# Patient Record
Sex: FEMALE | Race: ASIAN | NOT HISPANIC OR LATINO
[De-identification: names, ages, dates, MRNs, and addresses within clinical notes are randomized per-mention and may not be internally consistent; named-entity substitution may affect disease eponyms.]

---

## 2018-07-05 ENCOUNTER — APPOINTMENT (OUTPATIENT)
Dept: OBGYN | Facility: CLINIC | Age: 53
End: 2018-07-05

## 2019-04-05 ENCOUNTER — APPOINTMENT (OUTPATIENT)
Dept: MAMMOGRAPHY | Facility: CLINIC | Age: 54
End: 2019-04-05
Payer: COMMERCIAL

## 2019-04-05 ENCOUNTER — APPOINTMENT (OUTPATIENT)
Dept: OBGYN | Facility: CLINIC | Age: 54
End: 2019-04-05
Payer: COMMERCIAL

## 2019-04-05 ENCOUNTER — OUTPATIENT (OUTPATIENT)
Dept: OUTPATIENT SERVICES | Facility: HOSPITAL | Age: 54
LOS: 1 days | End: 2019-04-05
Payer: COMMERCIAL

## 2019-04-05 VITALS
DIASTOLIC BLOOD PRESSURE: 91 MMHG | SYSTOLIC BLOOD PRESSURE: 181 MMHG | BODY MASS INDEX: 31.55 KG/M2 | HEART RATE: 91 BPM | HEIGHT: 63 IN | WEIGHT: 178.1 LBS

## 2019-04-05 DIAGNOSIS — J98.01 ACUTE BRONCHOSPASM: ICD-10-CM

## 2019-04-05 DIAGNOSIS — Z12.31 ENCOUNTER FOR SCREENING MAMMOGRAM FOR MALIGNANT NEOPLASM OF BREAST: ICD-10-CM

## 2019-04-05 DIAGNOSIS — N85.2 HYPERTROPHY OF UTERUS: ICD-10-CM

## 2019-04-05 DIAGNOSIS — Z00.8 ENCOUNTER FOR OTHER GENERAL EXAMINATION: ICD-10-CM

## 2019-04-05 DIAGNOSIS — R03.0 ELEVATED BLOOD-PRESSURE READING, W/OUT DIAGNOSIS OF HYPERTENSION: ICD-10-CM

## 2019-04-05 DIAGNOSIS — N39.3 STRESS INCONTINENCE (FEMALE) (MALE): ICD-10-CM

## 2019-04-05 PROCEDURE — 77067 SCR MAMMO BI INCL CAD: CPT

## 2019-04-05 PROCEDURE — 99386 PREV VISIT NEW AGE 40-64: CPT

## 2019-04-05 PROCEDURE — 77063 BREAST TOMOSYNTHESIS BI: CPT | Mod: 26

## 2019-04-05 PROCEDURE — 77067 SCR MAMMO BI INCL CAD: CPT | Mod: 26

## 2019-04-05 PROCEDURE — 77063 BREAST TOMOSYNTHESIS BI: CPT

## 2019-04-05 NOTE — HISTORY OF PRESENT ILLNESS
[___ Year(s) Ago] : [unfilled] year(s) ago [Good] : being in good health [Sexually Active] : is sexually active [Monogamous] : is monogamous [Male ___] : [unfilled] male [NA] : N/A

## 2019-04-05 NOTE — PHYSICAL EXAM
[Awake] : awake [Alert] : alert [Acute Distress] : no acute distress [LAD] : no lymphadenopathy [Thyroid Nodule] : no thyroid nodule [Goiter] : no goiter [Mass] : no breast mass [Nipple Discharge] : no nipple discharge [Axillary LAD] : no axillary lymphadenopathy [Soft] : soft [Tender] : non tender [Distended] : not distended [None] : no CVA tenderness [Oriented x3] : oriented to person, place, and time [Depressed Mood] : not depressed [Normal] : uterus [Atrophy] : atrophy [No Bleeding] : there was no active vaginal bleeding [Pap Obtained] : a Pap smear was performed [Anteversion] : anteverted [Tenderness] : nontender [Enlarged ___ wks] : enlarged [unfilled] ~Uweeks [Uterine Adnexae] : were not tender and not enlarged

## 2019-04-11 ENCOUNTER — APPOINTMENT (OUTPATIENT)
Dept: INTERNAL MEDICINE | Facility: CLINIC | Age: 54
End: 2019-04-11
Payer: COMMERCIAL

## 2019-04-11 VITALS
OXYGEN SATURATION: 98 % | HEIGHT: 63 IN | SYSTOLIC BLOOD PRESSURE: 150 MMHG | WEIGHT: 169 LBS | HEART RATE: 81 BPM | DIASTOLIC BLOOD PRESSURE: 85 MMHG | BODY MASS INDEX: 29.95 KG/M2

## 2019-04-11 PROCEDURE — 99215 OFFICE O/P EST HI 40 MIN: CPT

## 2019-04-11 NOTE — HISTORY OF PRESENT ILLNESS
[FreeTextEntry8] : Elevated blood pressure reading noted by her Gyn and advised to f/u with PCP.\par \par Lost to follow-up since 2016. 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

## 2019-04-11 NOTE — COUNSELING
[Weight management counseling provided] : Weight management [Healthy eating counseling provided] : healthy eating [Activity counseling provided] : activity [Low Salt Diet] : Low salt diet [Decrease Portions] : Decrease food portions [Walking] : Walking

## 2019-04-11 NOTE — PHYSICAL EXAM
[Normal Sclera/Conjunctiva] : normal sclera/conjunctiva [No JVD] : no jugular venous distention [No Respiratory Distress] : no respiratory distress  [Clear to Auscultation] : lungs were clear to auscultation bilaterally [Normal Rate] : normal rate  [Regular Rhythm] : with a regular rhythm [No Edema] : there was no peripheral edema

## 2019-04-18 LAB
CYTOLOGY CVX/VAG DOC THIN PREP: NORMAL
HPV HIGH+LOW RISK DNA PNL CVX: NOT DETECTED

## 2019-04-28 LAB
25(OH)D3 SERPL-MCNC: 12.5 NG/ML
ALBUMIN SERPL ELPH-MCNC: 4.8 G/DL
ALP BLD-CCNC: 117 U/L
ALT SERPL-CCNC: 35 U/L
ANION GAP SERPL CALC-SCNC: 13 MMOL/L
AST SERPL-CCNC: 24 U/L
BASOPHILS # BLD AUTO: 0.09 K/UL
BASOPHILS NFR BLD AUTO: 1.5 %
BILIRUB SERPL-MCNC: 0.4 MG/DL
BUN SERPL-MCNC: 11 MG/DL
CALCIUM SERPL-MCNC: 9.8 MG/DL
CHLORIDE SERPL-SCNC: 103 MMOL/L
CHOLEST SERPL-MCNC: 246 MG/DL
CHOLEST/HDLC SERPL: 4.2 RATIO
CO2 SERPL-SCNC: 26 MMOL/L
CREAT SERPL-MCNC: 0.65 MG/DL
EOSINOPHIL # BLD AUTO: 0.2 K/UL
EOSINOPHIL NFR BLD AUTO: 3.4 %
ESTIMATED AVERAGE GLUCOSE: 154 MG/DL
GLUCOSE SERPL-MCNC: 95 MG/DL
HBA1C MFR BLD HPLC: 7 %
HCT VFR BLD CALC: 48.7 %
HDLC SERPL-MCNC: 58 MG/DL
HGB BLD-MCNC: 14.8 G/DL
IMM GRANULOCYTES NFR BLD AUTO: 0.5 %
LDLC SERPL CALC-MCNC: 141 MG/DL
LYMPHOCYTES # BLD AUTO: 1.81 K/UL
LYMPHOCYTES NFR BLD AUTO: 30.4 %
MAN DIFF?: NORMAL
MCHC RBC-ENTMCNC: 27.7 PG
MCHC RBC-ENTMCNC: 30.4 GM/DL
MCV RBC AUTO: 91.2 FL
MONOCYTES # BLD AUTO: 0.47 K/UL
MONOCYTES NFR BLD AUTO: 7.9 %
NEUTROPHILS # BLD AUTO: 3.35 K/UL
NEUTROPHILS NFR BLD AUTO: 56.3 %
PLATELET # BLD AUTO: 330 K/UL
POTASSIUM SERPL-SCNC: 3.9 MMOL/L
PROT SERPL-MCNC: 7.8 G/DL
RBC # BLD: 5.34 M/UL
RBC # FLD: 12.9 %
SODIUM SERPL-SCNC: 142 MMOL/L
TRIGL SERPL-MCNC: 236 MG/DL
TSH SERPL-ACNC: 2.24 UIU/ML
WBC # FLD AUTO: 5.95 K/UL

## 2019-05-06 ENCOUNTER — APPOINTMENT (OUTPATIENT)
Dept: INTERNAL MEDICINE | Facility: CLINIC | Age: 54
End: 2019-05-06
Payer: COMMERCIAL

## 2019-05-06 VITALS
OXYGEN SATURATION: 97 % | DIASTOLIC BLOOD PRESSURE: 90 MMHG | BODY MASS INDEX: 30.12 KG/M2 | SYSTOLIC BLOOD PRESSURE: 162 MMHG | WEIGHT: 170 LBS | HEART RATE: 87 BPM | HEIGHT: 63 IN

## 2019-05-06 VITALS — SYSTOLIC BLOOD PRESSURE: 130 MMHG | DIASTOLIC BLOOD PRESSURE: 88 MMHG

## 2019-05-06 DIAGNOSIS — R05 COUGH: ICD-10-CM

## 2019-05-06 PROCEDURE — G0009: CPT

## 2019-05-06 PROCEDURE — 99396 PREV VISIT EST AGE 40-64: CPT | Mod: 25

## 2019-05-06 PROCEDURE — 90472 IMMUNIZATION ADMIN EACH ADD: CPT

## 2019-05-06 PROCEDURE — 90732 PPSV23 VACC 2 YRS+ SUBQ/IM: CPT

## 2019-05-06 PROCEDURE — G0444 DEPRESSION SCREEN ANNUAL: CPT

## 2019-05-06 PROCEDURE — 90715 TDAP VACCINE 7 YRS/> IM: CPT

## 2019-05-06 PROCEDURE — G0442 ANNUAL ALCOHOL SCREEN 15 MIN: CPT

## 2019-07-07 NOTE — HISTORY OF PRESENT ILLNESS
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 possible post nasal drip, but started on ACE.\par

## 2019-07-07 NOTE — HEALTH RISK ASSESSMENT
[No falls in past year] : Patient reported no falls in the past year [0] : 1) Little interest or pleasure doing things: Not at all (0) [Patient reported mammogram was normal] : Patient reported mammogram was normal [Patient reported PAP Smear was normal] : Patient reported PAP Smear was normal [Patient reported colonoscopy was normal] : Patient reported colonoscopy was normal [Patient/Caregiver not ready to engage] : Patient/Caregiver not ready to engage [] : No [AHT0Ehiip] : 0 [MammogramDate] : 04/19 [MammogramComments] : Magali [PapSmearDate] : 04/19 [PapSmearComments] : Magali [ColonoscopyComments] :  [ColonoscopyDate] : 05/16

## 2019-07-07 NOTE — PHYSICAL EXAM
[No Acute Distress] : no acute distress [Well Nourished] : well nourished [Well Developed] : well developed [PERRL] : pupils equal round and reactive to light [Well-Appearing] : well-appearing [Normal Sclera/Conjunctiva] : normal sclera/conjunctiva [EOMI] : extraocular movements intact [Normal Outer Ear/Nose] : the outer ears and nose were normal in appearance [Normal Oropharynx] : the oropharynx was normal [Supple] : supple [No JVD] : no jugular venous distention [No Lymphadenopathy] : no lymphadenopathy [No Respiratory Distress] : no respiratory distress  [Thyroid Normal, No Nodules] : the thyroid was normal and there were no nodules present [Clear to Auscultation] : lungs were clear to auscultation bilaterally [Normal Rate] : normal rate  [No Accessory Muscle Use] : no accessory muscle use [No Murmur] : no murmur heard [Normal S1, S2] : normal S1 and S2 [Regular Rhythm] : with a regular rhythm [No Varicosities] : no varicosities [No Carotid Bruits] : no carotid bruits [No Abdominal Bruit] : a ~M bruit was not heard ~T in the abdomen [No Edema] : there was no peripheral edema [Pedal Pulses Present] : the pedal pulses are present [No Extremity Clubbing/Cyanosis] : no extremity clubbing/cyanosis [No Palpable Aorta] : no palpable aorta [Normal Appearance] : normal in appearance [No Nipple Discharge] : no nipple discharge [No Axillary Lymphadenopathy] : no axillary lymphadenopathy [Non Tender] : non-tender [Non-distended] : non-distended [Soft] : abdomen soft [No Masses] : no abdominal mass palpated [No HSM] : no HSM [Normal Bowel Sounds] : normal bowel sounds [No CVA Tenderness] : no CVA  tenderness [Normal Posterior Cervical Nodes] : no posterior cervical lymphadenopathy [Normal Anterior Cervical Nodes] : no anterior cervical lymphadenopathy [No Spinal Tenderness] : no spinal tenderness [Grossly Normal Strength/Tone] : grossly normal strength/tone [No Joint Swelling] : no joint swelling [No Rash] : no rash [Normal Gait] : normal gait [Coordination Grossly Intact] : coordination grossly intact [No Focal Deficits] : no focal deficits [Deep Tendon Reflexes (DTR)] : deep tendon reflexes were 2+ and symmetric [Normal Affect] : the affect was normal [Normal Insight/Judgement] : insight and judgment were intact

## 2021-07-23 ENCOUNTER — APPOINTMENT (OUTPATIENT)
Dept: INTERNAL MEDICINE | Facility: CLINIC | Age: 56
End: 2021-07-23
Payer: COMMERCIAL

## 2021-07-23 VITALS
SYSTOLIC BLOOD PRESSURE: 135 MMHG | DIASTOLIC BLOOD PRESSURE: 90 MMHG | WEIGHT: 171 LBS | BODY MASS INDEX: 30.3 KG/M2 | OXYGEN SATURATION: 98 % | HEIGHT: 63 IN | HEART RATE: 85 BPM

## 2021-07-23 DIAGNOSIS — Z12.83 ENCOUNTER FOR SCREENING FOR MALIGNANT NEOPLASM OF SKIN: ICD-10-CM

## 2021-07-23 DIAGNOSIS — B07.9 VIRAL WART, UNSPECIFIED: ICD-10-CM

## 2021-07-23 PROCEDURE — G0444 DEPRESSION SCREEN ANNUAL: CPT | Mod: 59

## 2021-07-23 PROCEDURE — 99396 PREV VISIT EST AGE 40-64: CPT | Mod: 25

## 2021-07-23 PROCEDURE — 99072 ADDL SUPL MATRL&STAF TM PHE: CPT

## 2021-07-23 PROCEDURE — G0442 ANNUAL ALCOHOL SCREEN 15 MIN: CPT

## 2021-07-23 RX ORDER — LISINOPRIL 5 MG/1
5 TABLET ORAL DAILY
Qty: 30 | Refills: 5 | Status: DISCONTINUED | COMMUNITY
Start: 2019-04-11 | End: 2021-07-23

## 2021-07-23 RX ORDER — ZOSTER VACCINE RECOMBINANT, ADJUVANTED 50 MCG/0.5
50 KIT INTRAMUSCULAR
Qty: 1 | Refills: 1 | Status: ACTIVE | COMMUNITY
Start: 2021-07-23 | End: 1900-01-01

## 2021-07-26 ENCOUNTER — APPOINTMENT (OUTPATIENT)
Dept: INTERNAL MEDICINE | Facility: CLINIC | Age: 56
End: 2021-07-26
Payer: COMMERCIAL

## 2021-07-26 PROBLEM — B07.9 WART ON THUMB: Status: ACTIVE | Noted: 2021-07-23

## 2021-07-26 LAB
ALBUMIN SERPL ELPH-MCNC: 4.7 G/DL
ALP BLD-CCNC: 127 U/L
ALT SERPL-CCNC: 25 U/L
ANION GAP SERPL CALC-SCNC: 14 MMOL/L
AST SERPL-CCNC: 20 U/L
BASOPHILS # BLD AUTO: 0.06 K/UL
BASOPHILS NFR BLD AUTO: 1 %
BILIRUB SERPL-MCNC: 0.4 MG/DL
BUN SERPL-MCNC: 14 MG/DL
CALCIUM SERPL-MCNC: 10.2 MG/DL
CHLORIDE SERPL-SCNC: 103 MMOL/L
CHOLEST SERPL-MCNC: 270 MG/DL
CO2 SERPL-SCNC: 23 MMOL/L
CREAT SERPL-MCNC: 0.73 MG/DL
EOSINOPHIL # BLD AUTO: 0.16 K/UL
EOSINOPHIL NFR BLD AUTO: 2.7 %
ESTIMATED AVERAGE GLUCOSE: 140 MG/DL
GLUCOSE SERPL-MCNC: 112 MG/DL
HBA1C MFR BLD HPLC: 6.5 %
HCT VFR BLD CALC: 45.6 %
HDLC SERPL-MCNC: 58 MG/DL
HGB BLD-MCNC: 14.5 G/DL
IMM GRANULOCYTES NFR BLD AUTO: 0.2 %
LDLC SERPL CALC-MCNC: 169 MG/DL
LDLC SERPL DIRECT ASSAY-MCNC: 177 MG/DL
LYMPHOCYTES # BLD AUTO: 1.75 K/UL
LYMPHOCYTES NFR BLD AUTO: 29.2 %
MAN DIFF?: NORMAL
MCHC RBC-ENTMCNC: 28.7 PG
MCHC RBC-ENTMCNC: 31.8 GM/DL
MCV RBC AUTO: 90.3 FL
MONOCYTES # BLD AUTO: 0.45 K/UL
MONOCYTES NFR BLD AUTO: 7.5 %
NEUTROPHILS # BLD AUTO: 3.57 K/UL
NEUTROPHILS NFR BLD AUTO: 59.4 %
NONHDLC SERPL-MCNC: 211 MG/DL
PLATELET # BLD AUTO: 379 K/UL
POTASSIUM SERPL-SCNC: 4.2 MMOL/L
PROT SERPL-MCNC: 7.7 G/DL
RBC # BLD: 5.05 M/UL
RBC # FLD: 13.5 %
SODIUM SERPL-SCNC: 140 MMOL/L
TRIGL SERPL-MCNC: 214 MG/DL
TSH SERPL-ACNC: 2.1 UIU/ML
WBC # FLD AUTO: 6 K/UL

## 2021-07-26 PROCEDURE — 99072 ADDL SUPL MATRL&STAF TM PHE: CPT

## 2021-07-26 PROCEDURE — 97802 MEDICAL NUTRITION INDIV IN: CPT

## 2021-07-29 DIAGNOSIS — Z23 ENCOUNTER FOR IMMUNIZATION: ICD-10-CM

## 2021-08-05 ENCOUNTER — APPOINTMENT (OUTPATIENT)
Dept: MAMMOGRAPHY | Facility: CLINIC | Age: 56
End: 2021-08-05
Payer: COMMERCIAL

## 2021-08-05 ENCOUNTER — OUTPATIENT (OUTPATIENT)
Dept: OUTPATIENT SERVICES | Facility: HOSPITAL | Age: 56
LOS: 1 days | End: 2021-08-05
Payer: COMMERCIAL

## 2021-08-05 ENCOUNTER — TRANSCRIPTION ENCOUNTER (OUTPATIENT)
Age: 56
End: 2021-08-05

## 2021-08-05 ENCOUNTER — RESULT REVIEW (OUTPATIENT)
Age: 56
End: 2021-08-05

## 2021-08-05 DIAGNOSIS — N60.19 DIFFUSE CYSTIC MASTOPATHY OF UNSPECIFIED BREAST: ICD-10-CM

## 2021-08-05 DIAGNOSIS — Z12.39 ENCOUNTER FOR OTHER SCREENING FOR MALIGNANT NEOPLASM OF BREAST: ICD-10-CM

## 2021-08-05 DIAGNOSIS — Z00.8 ENCOUNTER FOR OTHER GENERAL EXAMINATION: ICD-10-CM

## 2021-08-05 PROCEDURE — 77067 SCR MAMMO BI INCL CAD: CPT

## 2021-08-05 PROCEDURE — 77063 BREAST TOMOSYNTHESIS BI: CPT

## 2021-08-05 PROCEDURE — 77063 BREAST TOMOSYNTHESIS BI: CPT | Mod: 26

## 2021-08-05 PROCEDURE — 77067 SCR MAMMO BI INCL CAD: CPT | Mod: 26

## 2021-10-28 ENCOUNTER — APPOINTMENT (OUTPATIENT)
Dept: INTERNAL MEDICINE | Facility: CLINIC | Age: 56
End: 2021-10-28
Payer: COMMERCIAL

## 2021-10-28 VITALS
HEIGHT: 63 IN | WEIGHT: 167 LBS | OXYGEN SATURATION: 98 % | DIASTOLIC BLOOD PRESSURE: 90 MMHG | HEART RATE: 85 BPM | SYSTOLIC BLOOD PRESSURE: 160 MMHG | BODY MASS INDEX: 29.59 KG/M2

## 2021-10-28 PROCEDURE — 90686 IIV4 VACC NO PRSV 0.5 ML IM: CPT

## 2021-10-28 PROCEDURE — G0008: CPT

## 2021-10-28 PROCEDURE — 99214 OFFICE O/P EST MOD 30 MIN: CPT | Mod: 25

## 2021-11-29 LAB
ALBUMIN SERPL ELPH-MCNC: 4.8 G/DL
ALP BLD-CCNC: 109 U/L
ALT SERPL-CCNC: 22 U/L
ANION GAP SERPL CALC-SCNC: 15 MMOL/L
AST SERPL-CCNC: 19 U/L
BILIRUB SERPL-MCNC: 0.5 MG/DL
BUN SERPL-MCNC: 13 MG/DL
CALCIUM SERPL-MCNC: 9.9 MG/DL
CHLORIDE SERPL-SCNC: 104 MMOL/L
CHOLEST SERPL-MCNC: 257 MG/DL
CO2 SERPL-SCNC: 23 MMOL/L
CREAT SERPL-MCNC: 0.74 MG/DL
ESTIMATED AVERAGE GLUCOSE: 137 MG/DL
FRUCTOSAMINE SERPL-MCNC: 274 UMOL/L
GLUCOSE SERPL-MCNC: 96 MG/DL
HBA1C MFR BLD HPLC: 6.4 %
HDLC SERPL-MCNC: 59 MG/DL
INSULIN SERPL-MCNC: 17.7 UU/ML
LDLC SERPL CALC-MCNC: 155 MG/DL
NONHDLC SERPL-MCNC: 198 MG/DL
POTASSIUM SERPL-SCNC: 4.1 MMOL/L
PROT SERPL-MCNC: 7.9 G/DL
SODIUM SERPL-SCNC: 142 MMOL/L
TRIGL SERPL-MCNC: 216 MG/DL

## 2022-02-25 ENCOUNTER — NON-APPOINTMENT (OUTPATIENT)
Age: 57
End: 2022-02-25

## 2022-02-28 ENCOUNTER — NON-APPOINTMENT (OUTPATIENT)
Age: 57
End: 2022-02-28

## 2022-02-28 ENCOUNTER — APPOINTMENT (OUTPATIENT)
Dept: INTERNAL MEDICINE | Facility: CLINIC | Age: 57
End: 2022-02-28
Payer: COMMERCIAL

## 2022-02-28 VITALS — RESPIRATION RATE: 14 BRPM | HEIGHT: 63 IN | WEIGHT: 170 LBS | BODY MASS INDEX: 30.12 KG/M2

## 2022-02-28 VITALS — SYSTOLIC BLOOD PRESSURE: 130 MMHG | DIASTOLIC BLOOD PRESSURE: 90 MMHG

## 2022-02-28 VITALS — SYSTOLIC BLOOD PRESSURE: 140 MMHG | DIASTOLIC BLOOD PRESSURE: 90 MMHG

## 2022-02-28 PROCEDURE — 93000 ELECTROCARDIOGRAM COMPLETE: CPT

## 2022-02-28 PROCEDURE — 99214 OFFICE O/P EST MOD 30 MIN: CPT | Mod: 25

## 2022-04-03 LAB
ALBUMIN SERPL ELPH-MCNC: 4.9 G/DL
ALP BLD-CCNC: 116 U/L
ALT SERPL-CCNC: 20 U/L
ANION GAP SERPL CALC-SCNC: 14 MMOL/L
AST SERPL-CCNC: 16 U/L
BILIRUB SERPL-MCNC: 0.4 MG/DL
BUN SERPL-MCNC: 11 MG/DL
CALCIUM SERPL-MCNC: 10.2 MG/DL
CHLORIDE SERPL-SCNC: 106 MMOL/L
CHOLEST SERPL-MCNC: 259 MG/DL
CO2 SERPL-SCNC: 23 MMOL/L
CREAT SERPL-MCNC: 0.67 MG/DL
EGFR: 101 ML/MIN/1.73M2
ESTIMATED AVERAGE GLUCOSE: 140 MG/DL
GLUCOSE SERPL-MCNC: 89 MG/DL
HBA1C MFR BLD HPLC: 6.5 %
HDLC SERPL-MCNC: 61 MG/DL
LDLC SERPL CALC-MCNC: 166 MG/DL
NONHDLC SERPL-MCNC: 198 MG/DL
POTASSIUM SERPL-SCNC: 4.5 MMOL/L
PROT SERPL-MCNC: 8 G/DL
SODIUM SERPL-SCNC: 144 MMOL/L
TRIGL SERPL-MCNC: 160 MG/DL

## 2022-04-03 NOTE — HISTORY OF PRESENT ILLNESS
[de-identified] : 56  Chinese female, mother of two teenage girls, with obesity, hypertension, hyperlipidemia and prediabetes, here to follow-up blood pressure and labs.\par  is  in real estate, she is "manager"\par Daughter Jyoti 20 at [employer]-suffer from anxiety/depression-shares apartment in city with roommate\par Older daughter Annetta -working from home after [employer], working in [employer]\par Has brother 61 with special needs/epilepsy  who lives with her after mom moved to nursing home in [facility] in [place] for worsening dementia.\par Father  in  liver cancer/hepatits/drinker\par Fatherinlaw die idn  lung cancer/CAD-non smoker\par 2021-has a warty lesion on her right thumb for unknown period of time-months\par \karri Has treadmill but uses it infrequently\par Busy working out in [employer] [place] who is a \par Little time to cook, but making food , takeouts once to twice a week\par Herminio helps out with some soup\par Had cough in 2022 which went away, no fever, no congestion Rapid neg\par Moderna booster 2021\par Shingrix \par \par Hx HTN, noted by her Gyn to have gained weight with elevated BP  in 2019.-on Lisinopril but stopped it  after 1 week-cough, and prefers to work on her diet and not take any medications at this time.  Busy caring for her  mother x25 years–"mean and demented" and now and nursing  home as of  2021.  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  She did not follow-up in  as busy with her mother with dementia and also  Covid pandemic.  Labs were sent to her which she acknowledges which indicated that her A1c had gone up to 7% and cholesterol was elevated.  Patient has been working on her diet as much as possible though she admits she has not had time to exercise, and her diet has been erratic as busy with her mother who was recently placed in a nursing home in 2021.\par Blood pressure remains borderline today on no medications.  Her BMI is slightly over 30 with a weight of 170 pounds.  Still declines taking any  medications for blood pressure, diabetes A1c 6.4%, or cholesterol and prefers to work on her diet and begin an exercise program.  Admits to eating salty food from her takeout's which she does majority of the time.  She met with a dietitian, last seen 2021 and agrees to follow-up with me in 3 months to repeat labs, A1c and BP.

## 2022-04-03 NOTE — HEALTH RISK ASSESSMENT
[No] : No [No falls in past year] : Patient reported no falls in the past year [0] : 2) Feeling down, depressed, or hopeless: Not at all (0) [PHQ-2 Negative - No further assessment needed] : PHQ-2 Negative - No further assessment needed [Patient reported mammogram was normal] : Patient reported mammogram was normal [Patient reported PAP Smear was normal] : Patient reported PAP Smear was normal [Patient reported colonoscopy was normal] : Patient reported colonoscopy was normal [Audit-CScore] : 0 [ARO2Seesx] : 0 [MammogramDate] : 04/19 [MammogramComments] : Magali [PapSmearDate] : 04/19 [PapSmearComments] : Magali [ColonoscopyDate] : 05/16 [ColonoscopyComments] :

## 2022-07-28 ENCOUNTER — APPOINTMENT (OUTPATIENT)
Dept: INTERNAL MEDICINE | Facility: CLINIC | Age: 57
End: 2022-07-28

## 2022-07-28 VITALS
BODY MASS INDEX: 30.83 KG/M2 | SYSTOLIC BLOOD PRESSURE: 140 MMHG | WEIGHT: 174 LBS | HEART RATE: 78 BPM | OXYGEN SATURATION: 97 % | HEIGHT: 63 IN | DIASTOLIC BLOOD PRESSURE: 90 MMHG

## 2022-07-28 DIAGNOSIS — Z78.0 ASYMPTOMATIC MENOPAUSAL STATE: ICD-10-CM

## 2022-07-28 PROCEDURE — G0444 DEPRESSION SCREEN ANNUAL: CPT | Mod: 59

## 2022-07-28 PROCEDURE — 99396 PREV VISIT EST AGE 40-64: CPT

## 2022-08-01 LAB
25(OH)D3 SERPL-MCNC: 18.9 NG/ML
ALBUMIN SERPL ELPH-MCNC: 5.1 G/DL
ALP BLD-CCNC: 123 U/L
ALT SERPL-CCNC: 44 U/L
ANION GAP SERPL CALC-SCNC: 13 MMOL/L
APPEARANCE: CLEAR
AST SERPL-CCNC: 27 U/L
BACTERIA: NEGATIVE
BASOPHILS # BLD AUTO: 0.06 K/UL
BASOPHILS NFR BLD AUTO: 0.9 %
BILIRUB SERPL-MCNC: 0.8 MG/DL
BILIRUBIN URINE: NEGATIVE
BLOOD URINE: NEGATIVE
BUN SERPL-MCNC: 12 MG/DL
CALCIUM SERPL-MCNC: 10.1 MG/DL
CHLORIDE SERPL-SCNC: 104 MMOL/L
CHOLEST SERPL-MCNC: 165 MG/DL
CO2 SERPL-SCNC: 25 MMOL/L
COLOR: NORMAL
CREAT SERPL-MCNC: 0.72 MG/DL
CREAT SPEC-SCNC: 48 MG/DL
EGFR: 97 ML/MIN/1.73M2
EOSINOPHIL # BLD AUTO: 0.19 K/UL
EOSINOPHIL NFR BLD AUTO: 2.8 %
ESTIMATED AVERAGE GLUCOSE: 166 MG/DL
FRUCTOSAMINE SERPL-MCNC: 286 UMOL/L
GLUCOSE QUALITATIVE U: NEGATIVE
GLUCOSE SERPL-MCNC: 121 MG/DL
HBA1C MFR BLD HPLC: 7.4 %
HCT VFR BLD CALC: 46.3 %
HDLC SERPL-MCNC: 58 MG/DL
HGB BLD-MCNC: 14.4 G/DL
HYALINE CASTS: 1 /LPF
IMM GRANULOCYTES NFR BLD AUTO: 0.1 %
KETONES URINE: NEGATIVE
LDLC SERPL CALC-MCNC: 76 MG/DL
LEUKOCYTE ESTERASE URINE: NEGATIVE
LYMPHOCYTES # BLD AUTO: 1.72 K/UL
LYMPHOCYTES NFR BLD AUTO: 25.4 %
MAN DIFF?: NORMAL
MCHC RBC-ENTMCNC: 27.6 PG
MCHC RBC-ENTMCNC: 31.1 GM/DL
MCV RBC AUTO: 88.9 FL
MICROALBUMIN 24H UR DL<=1MG/L-MCNC: <1.2 MG/DL
MICROALBUMIN/CREAT 24H UR-RTO: NORMAL MG/G
MICROSCOPIC-UA: NORMAL
MONOCYTES # BLD AUTO: 0.49 K/UL
MONOCYTES NFR BLD AUTO: 7.2 %
NEUTROPHILS # BLD AUTO: 4.3 K/UL
NEUTROPHILS NFR BLD AUTO: 63.6 %
NITRITE URINE: NEGATIVE
NONHDLC SERPL-MCNC: 107 MG/DL
PH URINE: 6.5
PLATELET # BLD AUTO: 347 K/UL
POTASSIUM SERPL-SCNC: 4.2 MMOL/L
PROT SERPL-MCNC: 8 G/DL
PROTEIN URINE: NEGATIVE
RBC # BLD: 5.21 M/UL
RBC # FLD: 12.9 %
RED BLOOD CELLS URINE: 1 /HPF
SODIUM SERPL-SCNC: 142 MMOL/L
SPECIFIC GRAVITY URINE: 1.01
SQUAMOUS EPITHELIAL CELLS: 1 /HPF
TRIGL SERPL-MCNC: 155 MG/DL
TSH SERPL-ACNC: 1.86 UIU/ML
UROBILINOGEN URINE: NORMAL
WBC # FLD AUTO: 6.77 K/UL
WHITE BLOOD CELLS URINE: 0 /HPF

## 2022-08-01 NOTE — HISTORY OF PRESENT ILLNESS
[de-identified] : 56 Chinese female, mother of two teenages, here for CPE.\par 7/2021-has a warty lesion on he right thumb for unknown period of time-months?\par \par Hx HTN, noted by her Gyn to have gained weight with elevated BP  in April 2019.-on Lisinopril but stopped it  after 1 week-cough, and prefers to work on her diet and not take any medications at this time.\par BP remains borderline 140/90, not always adhering to low salt diet\par Busy caring for  mother x 25 years-"mean and demented" and now in nursing home as of  June 24, 2021.  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  She did not follow-up in 2020 as busy with her mother with dementia and also  Covid pandemic.  Labs were sent to her which she acknowledges which indicated that her A1c had gone up to 7% and cholesterol was elevated.  Patient has been working on her diet as much as possible though she admits she has not had time to exercise, and her diet has been erratic as busy with her mother who was recently placed in a nursing home in June 2021.\par Blood pressure remains borderline today at 140/90 no medications.  Her BMI is slightly over 30 with a weight up at 174 pounds.  Still declines taking any  medications for blood pressure, diabetes, but did begin statin  in feb 2022 for cholesterol and prefers to work on her diet and begin an exercise program.  Admits to eating salty food from her takeout's which she does majority of the time.  She will meet with a dietitian and agrees to follow-up with me in 3 months to repeat labs and BP.

## 2022-08-01 NOTE — HEALTH RISK ASSESSMENT
[] : No [No] : No [Never (0 pts)] : Never (0 points) [No falls in past year] : Patient reported no falls in the past year [0] : 2) Feeling down, depressed, or hopeless: Not at all (0) [PHQ-2 Negative - No further assessment needed] : PHQ-2 Negative - No further assessment needed [I have developed a follow-up plan documented below in the note.] : I have developed a follow-up plan documented below in the note. [Audit-CScore] : 0 [[id number]] : 0 [Patient reported mammogram was normal] : Patient reported mammogram was normal [Patient reported PAP Smear was normal] : Patient reported PAP Smear was normal [Patient reported colonoscopy was normal] : Patient reported colonoscopy was normal [MammogramDate] : 04/19 [MammogramComments] : Magali [PapSmearDate] : 04/19 [PapSmearComments] : Magali [ColonoscopyDate] : 05/16 [ColonoscopyComments] :

## 2022-08-01 NOTE — HEALTH RISK ASSESSMENT
[No] : No [No falls in past year] : Patient reported no falls in the past year [0] : 2) Feeling down, depressed, or hopeless: Not at all (0) [PHQ-2 Negative - No further assessment needed] : PHQ-2 Negative - No further assessment needed [Audit-CScore] : 0 [[id number]] : 0 [Patient reported mammogram was normal] : Patient reported mammogram was normal [Patient reported PAP Smear was normal] : Patient reported PAP Smear was normal [Patient reported colonoscopy was normal] : Patient reported colonoscopy was normal [MammogramDate] : 04/19 [MammogramComments] : Magali [PapSmearDate] : 04/19 [PapSmearComments] : Magali [ColonoscopyDate] : 05/16 [ColonoscopyComments] :

## 2022-08-01 NOTE — HISTORY OF PRESENT ILLNESS
[de-identified] : 56 Chinese female, mother of two teenages, here for CPE.\par 7/2021-has a warty lesion on he right thumb for unknown period of time-months?\par \par Hx HTN, noted by her Gyn to have gained weight with elevated BP  in April 2019.-on Lisinopril but stopped it  after 1 week-cough, and prefers to work on her diet and not take any medications at this time.\par BP remains borderline 140/90, not always adhering to low salt diet\par Busy caring for  mother x 25 years-"mean and demented" and now in nursing home as of  June 24, 2021.  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  She did not follow-up in 2020 as busy with her mother with dementia and also  Covid pandemic.  Labs were sent to her which she acknowledges which indicated that her A1c had gone up to 7% and cholesterol was elevated.  Patient has been working on her diet as much as possible though she admits she has not had time to exercise, and her diet has been erratic as busy with her mother who was recently placed in a nursing home in June 2021.\par Blood pressure remains borderline today at 140/90 no medications.  Her BMI is slightly over 30 with a weight up at 174 pounds.  Still declines taking any  medications for blood pressure, diabetes, but did begin statin  in feb 2022 for cholesterol and prefers to work on her diet and begin an exercise program.  Admits to eating salty food from her takeout's which she does majority of the time.  She will meet with a dietitian and agrees to follow-up with me in 3 months to repeat labs and BP.

## 2022-08-01 NOTE — REVIEW OF SYSTEMS
LABS:                        16.2   15.25 )-----------( 383      ( 19 Oct 2018 04:00 )             48.5     10-19    140  |  99  |  10  ----------------------------<  121<H>  4.1   |  24  |  0.91    Ca    9.9      19 Oct 2018 04:00    TPro  8.0  /  Alb  4.6  /  TBili  0.3  /  DBili  x   /  AST  21  /  ALT  35  /  AlkPhos  84  10-19    PT/INR - ( 19 Oct 2018 04:00 )   PT: 12.3 SEC;   INR: 1.11          PTT - ( 19 Oct 2018 04:00 )  PTT:30.6 SEC      Venous Blood Gas:  10-19 @ 07:08  7.32/49/50/23/79.9  VBG Lactate: 4.1  Venous Blood Gas:  10-19 @ 04:00  7.34/53/39/24/68.0  VBG Lactate: 4.0    RADIOLOGY:  [ x] Reviewed and interpreted by me: CXR without acute pulmonary pathology [Recent Change In Weight] : ~T recent weight change [Negative] : Heme/Lymph

## 2022-08-01 NOTE — HEALTH RISK ASSESSMENT
[] : No [No] : No [Never (0 pts)] : Never (0 points) [No falls in past year] : Patient reported no falls in the past year [0] : 2) Feeling down, depressed, or hopeless: Not at all (0) [PHQ-2 Negative - No further assessment needed] : PHQ-2 Negative - No further assessment needed [I have developed a follow-up plan documented below in the note.] : I have developed a follow-up plan documented below in the note. [Audit-CScore] : 0 [[id number]] : 0 [Patient reported mammogram was normal] : Patient reported mammogram was normal [Patient reported PAP Smear was normal] : Patient reported PAP Smear was normal [Patient reported colonoscopy was normal] : Patient reported colonoscopy was normal [MammogramDate] : 04/19 [MammogramComments] : Magali [PapSmearDate] : 04/19 [PapSmearComments] : Magali [ColonoscopyDate] : 05/16 [ColonoscopyComments] :

## 2022-08-01 NOTE — HISTORY OF PRESENT ILLNESS
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, caring for two teenagers and dealing with "difficult/mean" mother, now in nursing home since 2021.\par \par Leaving for 2 weeks with dinesh to [place] in Sept!!\par Admits to not adhering to diet as sno time to cook, mother pao makes chinces meals and irrgeualr eating times.\par \par 7/2021-has a warty lesion on her right thumb for unknown period of time-months?\par \par Hx HTN, noted by her Gyn to have gained weight with elevated BP  in April 2019.-on Lisinopril but stopped it  after 1 week-cough, and prefers to work on her diet and not take any medications at this time, and prefers not to be on meds\par Busy caring for  mother x 25 years-"mean and demented" and now in nuris home as of  June 24, 2021.  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  Her BMI is slightly over 30 with a weight of 171 pounds.  \par Declined taking any  medications for blood pressure, diabetes, or cholesterol \par but begin statin Feb 2022, need f/u labs/lipids\par Willing to consider metformin  now and to repeat A1c

## 2022-08-11 ENCOUNTER — RESULT REVIEW (OUTPATIENT)
Age: 57
End: 2022-08-11

## 2022-08-11 ENCOUNTER — OUTPATIENT (OUTPATIENT)
Dept: OUTPATIENT SERVICES | Facility: HOSPITAL | Age: 57
LOS: 1 days | End: 2022-08-11
Payer: COMMERCIAL

## 2022-08-11 ENCOUNTER — APPOINTMENT (OUTPATIENT)
Dept: MAMMOGRAPHY | Facility: CLINIC | Age: 57
End: 2022-08-11

## 2022-08-11 ENCOUNTER — APPOINTMENT (OUTPATIENT)
Dept: RADIOLOGY | Facility: CLINIC | Age: 57
End: 2022-08-11

## 2022-08-11 DIAGNOSIS — Z00.8 ENCOUNTER FOR OTHER GENERAL EXAMINATION: ICD-10-CM

## 2022-08-11 DIAGNOSIS — N60.19 DIFFUSE CYSTIC MASTOPATHY OF UNSPECIFIED BREAST: ICD-10-CM

## 2022-08-11 DIAGNOSIS — Z78.0 ASYMPTOMATIC MENOPAUSAL STATE: ICD-10-CM

## 2022-08-11 PROCEDURE — 77067 SCR MAMMO BI INCL CAD: CPT | Mod: 26

## 2022-08-11 PROCEDURE — 77080 DXA BONE DENSITY AXIAL: CPT | Mod: 26

## 2022-08-11 PROCEDURE — 77063 BREAST TOMOSYNTHESIS BI: CPT

## 2022-08-11 PROCEDURE — 77080 DXA BONE DENSITY AXIAL: CPT

## 2022-08-11 PROCEDURE — 77063 BREAST TOMOSYNTHESIS BI: CPT | Mod: 26

## 2022-08-11 PROCEDURE — 77067 SCR MAMMO BI INCL CAD: CPT

## 2022-09-06 ENCOUNTER — RX RENEWAL (OUTPATIENT)
Age: 57
End: 2022-09-06

## 2022-09-22 ENCOUNTER — APPOINTMENT (OUTPATIENT)
Dept: OBGYN | Facility: CLINIC | Age: 57
End: 2022-09-22

## 2022-09-22 VITALS
BODY MASS INDEX: 29.06 KG/M2 | WEIGHT: 164 LBS | HEART RATE: 71 BPM | SYSTOLIC BLOOD PRESSURE: 141 MMHG | HEIGHT: 63 IN | DIASTOLIC BLOOD PRESSURE: 80 MMHG

## 2022-09-22 PROCEDURE — 99386 PREV VISIT NEW AGE 40-64: CPT

## 2022-09-22 NOTE — REASON FOR VISIT
[Annual] : an annual visit. Acitretin Counseling:  I discussed with the patient the risks of acitretin including but not limited to hair loss, dry lips/skin/eyes, liver damage, hyperlipidemia, depression/suicidal ideation, photosensitivity.  Serious rare side effects can include but are not limited to pancreatitis, pseudotumor cerebri, bony changes, clot formation/stroke/heart attack.  Patient understands that alcohol is contraindicated since it can result in liver toxicity and significantly prolong the elimination of the drug by many years.

## 2022-09-24 LAB — HPV HIGH+LOW RISK DNA PNL CVX: NOT DETECTED

## 2022-09-27 LAB — CYTOLOGY CVX/VAG DOC THIN PREP: ABNORMAL

## 2022-09-29 ENCOUNTER — OUTPATIENT (OUTPATIENT)
Dept: OUTPATIENT SERVICES | Facility: HOSPITAL | Age: 57
LOS: 1 days | End: 2022-09-29
Payer: COMMERCIAL

## 2022-09-29 ENCOUNTER — TRANSCRIPTION ENCOUNTER (OUTPATIENT)
Age: 57
End: 2022-09-29

## 2022-09-29 ENCOUNTER — APPOINTMENT (OUTPATIENT)
Dept: ULTRASOUND IMAGING | Facility: CLINIC | Age: 57
End: 2022-09-29

## 2022-09-29 ENCOUNTER — RESULT REVIEW (OUTPATIENT)
Age: 57
End: 2022-09-29

## 2022-09-29 DIAGNOSIS — Z00.8 ENCOUNTER FOR OTHER GENERAL EXAMINATION: ICD-10-CM

## 2022-09-29 DIAGNOSIS — N85.2 HYPERTROPHY OF UTERUS: ICD-10-CM

## 2022-09-29 PROCEDURE — 76830 TRANSVAGINAL US NON-OB: CPT | Mod: 26

## 2022-09-29 PROCEDURE — 76856 US EXAM PELVIC COMPLETE: CPT | Mod: 26

## 2022-09-29 PROCEDURE — 76856 US EXAM PELVIC COMPLETE: CPT

## 2022-09-29 PROCEDURE — 76830 TRANSVAGINAL US NON-OB: CPT

## 2022-12-01 ENCOUNTER — APPOINTMENT (OUTPATIENT)
Dept: INTERNAL MEDICINE | Facility: CLINIC | Age: 57
End: 2022-12-01

## 2022-12-03 ENCOUNTER — RX RENEWAL (OUTPATIENT)
Age: 57
End: 2022-12-03

## 2023-07-31 ENCOUNTER — APPOINTMENT (OUTPATIENT)
Dept: INTERNAL MEDICINE | Facility: CLINIC | Age: 58
End: 2023-07-31
Payer: COMMERCIAL

## 2023-07-31 VITALS
WEIGHT: 176 LBS | HEIGHT: 63.75 IN | DIASTOLIC BLOOD PRESSURE: 74 MMHG | OXYGEN SATURATION: 97 % | BODY MASS INDEX: 30.42 KG/M2 | SYSTOLIC BLOOD PRESSURE: 130 MMHG | HEART RATE: 78 BPM

## 2023-07-31 DIAGNOSIS — E66.9 OBESITY, UNSPECIFIED: ICD-10-CM

## 2023-07-31 DIAGNOSIS — N60.19 DIFFUSE CYSTIC MASTOPATHY OF UNSPECIFIED BREAST: ICD-10-CM

## 2023-07-31 DIAGNOSIS — Z12.11 ENCOUNTER FOR SCREENING FOR MALIGNANT NEOPLASM OF COLON: ICD-10-CM

## 2023-07-31 DIAGNOSIS — E78.5 HYPERLIPIDEMIA, UNSPECIFIED: ICD-10-CM

## 2023-07-31 DIAGNOSIS — Z00.00 ENCOUNTER FOR GENERAL ADULT MEDICAL EXAMINATION W/OUT ABNORMAL FINDINGS: ICD-10-CM

## 2023-07-31 PROCEDURE — 99396 PREV VISIT EST AGE 40-64: CPT | Mod: 25

## 2023-07-31 PROCEDURE — G0444 DEPRESSION SCREEN ANNUAL: CPT | Mod: 59

## 2023-07-31 NOTE — REVIEW OF SYSTEMS
Qbrexza Pregnancy And Lactation Text: There is no available data on Qbrexza use in pregnant women.  There is no available data on Qbrexza use in lactation. [Recent Change In Weight] : ~T recent weight change [Negative] : Heme/Lymph

## 2023-08-21 ENCOUNTER — TRANSCRIPTION ENCOUNTER (OUTPATIENT)
Age: 58
End: 2023-08-21

## 2023-08-21 PROBLEM — E78.5 HYPERLIPIDEMIA: Status: ACTIVE | Noted: 2021-07-26

## 2023-08-21 PROBLEM — E66.9 OBESITY (BMI 30-39.9): Status: ACTIVE | Noted: 2022-04-03

## 2023-08-21 LAB
ALBUMIN SERPL ELPH-MCNC: 4.9 G/DL
ALP BLD-CCNC: 114 U/L
ALT SERPL-CCNC: 26 U/L
ANION GAP SERPL CALC-SCNC: 13 MMOL/L
AST SERPL-CCNC: 20 U/L
BILIRUB SERPL-MCNC: 0.7 MG/DL
BUN SERPL-MCNC: 14 MG/DL
CALCIUM SERPL-MCNC: 10 MG/DL
CHLORIDE SERPL-SCNC: 104 MMOL/L
CHOLEST SERPL-MCNC: 179 MG/DL
CO2 SERPL-SCNC: 24 MMOL/L
CREAT SERPL-MCNC: 0.77 MG/DL
CREAT SPEC-SCNC: 149 MG/DL
EGFR: 89 ML/MIN/1.73M2
ESTIMATED AVERAGE GLUCOSE: 166 MG/DL
GLUCOSE SERPL-MCNC: 104 MG/DL
HBA1C MFR BLD HPLC: 7.4 %
HDLC SERPL-MCNC: 61 MG/DL
LDLC SERPL CALC-MCNC: 92 MG/DL
MICROALBUMIN 24H UR DL<=1MG/L-MCNC: <1.2 MG/DL
MICROALBUMIN/CREAT 24H UR-RTO: NORMAL MG/G
NONHDLC SERPL-MCNC: 118 MG/DL
POTASSIUM SERPL-SCNC: 3.9 MMOL/L
PROT SERPL-MCNC: 8.1 G/DL
SODIUM SERPL-SCNC: 142 MMOL/L
TRIGL SERPL-MCNC: 153 MG/DL
TSH SERPL-ACNC: 1.61 UIU/ML
VIT B12 SERPL-MCNC: 332 PG/ML

## 2023-08-21 NOTE — HEALTH RISK ASSESSMENT
[No] : No [No falls in past year] : Patient reported no falls in the past year [0] : 2) Feeling down, depressed, or hopeless: Not at all (0) [PHQ-2 Negative - No further assessment needed] : PHQ-2 Negative - No further assessment needed [Patient reported mammogram was normal] : Patient reported mammogram was normal [Patient reported PAP Smear was normal] : Patient reported PAP Smear was normal [Patient reported colonoscopy was normal] : Patient reported colonoscopy was normal [Audit-CScore] : 0 [[id number]] : 0 [MammogramDate] : 04/19 [MammogramComments] : Magali [PapSmearDate] : 04/19 [PapSmearComments] : Magali [ColonoscopyDate] : 05/16 [ColonoscopyComments] :  [Never] : Never

## 2023-08-21 NOTE — HISTORY OF PRESENT ILLNESS
[de-identified] : 57 Chinese female, mother of two teenagers, here for CPE. 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   Went on vacation with spouse to [place] in Sept 2022. Admits to not adhering to diet as no time to cook, mother pao makes chinces meals and irrgeualr eating times.  7/2021-has a warty lesion on her right thumb for unknown period of time-months?  Hx HTN, noted by her Gyn to have gained weight with elevated BP  in April 2019.-on Lisinopril but stopped after cough, now on ARB. 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 She has not had time to exercise, and her diet has been erratic as busy with her mother/home business/teenage kid. Declined meds until 2022. Began statin Feb 2022, need f/u labs/lipids Begn  metformin July 2022 and to repeat A1c, not checing FS DEXA Aug 2022-normal-f/u 2-3 year Mammogram Aug 2022, f/u 2023 Colonoscopy DR hWarton 2016-f/u 10 years Gyn f/u-Sept 2022 DR Jamison

## 2023-08-22 ENCOUNTER — TRANSCRIPTION ENCOUNTER (OUTPATIENT)
Age: 58
End: 2023-08-22

## 2023-08-23 ENCOUNTER — TRANSCRIPTION ENCOUNTER (OUTPATIENT)
Age: 58
End: 2023-08-23

## 2023-11-21 ENCOUNTER — RESULT REVIEW (OUTPATIENT)
Age: 58
End: 2023-11-21

## 2023-11-21 ENCOUNTER — APPOINTMENT (OUTPATIENT)
Dept: MAMMOGRAPHY | Facility: CLINIC | Age: 58
End: 2023-11-21
Payer: COMMERCIAL

## 2023-11-21 ENCOUNTER — OUTPATIENT (OUTPATIENT)
Dept: OUTPATIENT SERVICES | Facility: HOSPITAL | Age: 58
LOS: 1 days | End: 2023-11-21
Payer: COMMERCIAL

## 2023-11-21 DIAGNOSIS — Z00.8 ENCOUNTER FOR OTHER GENERAL EXAMINATION: ICD-10-CM

## 2023-11-21 DIAGNOSIS — N60.19 DIFFUSE CYSTIC MASTOPATHY OF UNSPECIFIED BREAST: ICD-10-CM

## 2023-11-21 PROCEDURE — 77067 SCR MAMMO BI INCL CAD: CPT

## 2023-11-21 PROCEDURE — 77063 BREAST TOMOSYNTHESIS BI: CPT | Mod: 26

## 2023-11-21 PROCEDURE — 77063 BREAST TOMOSYNTHESIS BI: CPT

## 2023-11-21 PROCEDURE — 77067 SCR MAMMO BI INCL CAD: CPT | Mod: 26

## 2023-12-06 ENCOUNTER — RX RENEWAL (OUTPATIENT)
Age: 58
End: 2023-12-06

## 2023-12-06 RX ORDER — ATORVASTATIN CALCIUM 20 MG/1
20 TABLET, FILM COATED ORAL
Qty: 90 | Refills: 3 | Status: ACTIVE | COMMUNITY
Start: 2022-02-28 | End: 1900-01-01

## 2024-01-31 ENCOUNTER — RX RENEWAL (OUTPATIENT)
Age: 59
End: 2024-01-31

## 2024-02-29 ENCOUNTER — APPOINTMENT (OUTPATIENT)
Dept: OBGYN | Facility: CLINIC | Age: 59
End: 2024-02-29
Payer: COMMERCIAL

## 2024-02-29 VITALS
WEIGHT: 171 LBS | HEIGHT: 63.75 IN | SYSTOLIC BLOOD PRESSURE: 179 MMHG | BODY MASS INDEX: 29.55 KG/M2 | DIASTOLIC BLOOD PRESSURE: 83 MMHG | HEART RATE: 83 BPM

## 2024-02-29 VITALS — SYSTOLIC BLOOD PRESSURE: 168 MMHG | DIASTOLIC BLOOD PRESSURE: 88 MMHG

## 2024-02-29 DIAGNOSIS — Z01.419 ENCOUNTER FOR GYNECOLOGICAL EXAMINATION (GENERAL) (ROUTINE) W/OUT ABNORMAL FINDINGS: ICD-10-CM

## 2024-02-29 PROCEDURE — 99396 PREV VISIT EST AGE 40-64: CPT

## 2024-02-29 NOTE — PHYSICAL EXAM
[Appropriately responsive] : appropriately responsive [Chaperone Present] : A chaperone was present in the examining room during all aspects of the physical examination [Alert] : alert [No Acute Distress] : no acute distress [No Lymphadenopathy] : no lymphadenopathy [Soft] : soft [Non-tender] : non-tender [Non-distended] : non-distended [No HSM] : No HSM [No Lesions] : no lesions [No Mass] : no mass [Oriented x3] : oriented x3 [Examination Of The Breasts] : a normal appearance [No Masses] : no breast masses were palpable [Labia Majora] : normal [Labia Minora] : normal [Normal] : normal [Uterine Adnexae] : normal

## 2024-02-29 NOTE — HISTORY OF PRESENT ILLNESS
[Mammogramdate] : emr [BreastSonogramDate] : emr [ColonoscopyDate] : emr [BoneDensityDate] : emr [PapSmeardate] : emr

## 2024-03-01 ENCOUNTER — OUTPATIENT (OUTPATIENT)
Dept: OUTPATIENT SERVICES | Facility: HOSPITAL | Age: 59
LOS: 1 days | End: 2024-03-01
Payer: COMMERCIAL

## 2024-03-01 ENCOUNTER — APPOINTMENT (OUTPATIENT)
Dept: INTERNAL MEDICINE | Facility: CLINIC | Age: 59
End: 2024-03-01
Payer: COMMERCIAL

## 2024-03-01 DIAGNOSIS — I10 ESSENTIAL (PRIMARY) HYPERTENSION: ICD-10-CM

## 2024-03-01 DIAGNOSIS — E11.9 TYPE 2 DIABETES MELLITUS W/OUT COMPLICATIONS: ICD-10-CM

## 2024-03-01 PROCEDURE — 99214 OFFICE O/P EST MOD 30 MIN: CPT

## 2024-03-01 PROCEDURE — G2211 COMPLEX E/M VISIT ADD ON: CPT | Mod: NC,1L

## 2024-03-01 PROCEDURE — G0463: CPT

## 2024-03-01 RX ORDER — METFORMIN HYDROCHLORIDE 500 MG/1
500 TABLET, COATED ORAL
Qty: 270 | Refills: 3 | Status: ACTIVE | COMMUNITY
Start: 2022-07-28 | End: 1900-01-01

## 2024-03-01 RX ORDER — IRBESARTAN 75 MG/1
75 TABLET ORAL DAILY
Qty: 90 | Refills: 3 | Status: ACTIVE | COMMUNITY
Start: 2022-07-28 | End: 1900-01-01

## 2024-03-03 VITALS — DIASTOLIC BLOOD PRESSURE: 90 MMHG | SYSTOLIC BLOOD PRESSURE: 150 MMHG

## 2024-03-03 PROBLEM — E11.9 DIABETES MELLITUS TYPE 2 IN NONOBESE: Status: ACTIVE | Noted: 2021-07-23

## 2024-03-03 PROBLEM — I10 ESSENTIAL HYPERTENSION: Status: ACTIVE | Noted: 2021-07-23

## 2024-03-03 PROBLEM — I10 ELEVATED BLOOD PRESSURE READING WITH DIAGNOSIS OF HYPERTENSION: Status: ACTIVE | Noted: 2019-04-05

## 2024-03-03 LAB
ALBUMIN SERPL ELPH-MCNC: 4.8 G/DL
ALP BLD-CCNC: 109 U/L
ALT SERPL-CCNC: 20 U/L
ANION GAP SERPL CALC-SCNC: 16 MMOL/L
AST SERPL-CCNC: 14 U/L
BILIRUB SERPL-MCNC: 0.4 MG/DL
BUN SERPL-MCNC: 15 MG/DL
CALCIUM SERPL-MCNC: 10.3 MG/DL
CHLORIDE SERPL-SCNC: 103 MMOL/L
CHOLEST SERPL-MCNC: 166 MG/DL
CO2 SERPL-SCNC: 26 MMOL/L
CREAT SERPL-MCNC: 0.75 MG/DL
CREAT SPEC-SCNC: 84 MG/DL
EGFR: 91 ML/MIN/1.73M2
ESTIMATED AVERAGE GLUCOSE: 131 MG/DL
GLUCOSE SERPL-MCNC: 87 MG/DL
HBA1C MFR BLD HPLC: 6.2 %
HCT VFR BLD CALC: 42.8 %
HDLC SERPL-MCNC: 64 MG/DL
HGB BLD-MCNC: 13.4 G/DL
HPV HIGH+LOW RISK DNA PNL CVX: NOT DETECTED
LDLC SERPL CALC-MCNC: 81 MG/DL
LDLC SERPL DIRECT ASSAY-MCNC: 79 MG/DL
MCHC RBC-ENTMCNC: 27.7 PG
MCHC RBC-ENTMCNC: 31.3 GM/DL
MCV RBC AUTO: 88.4 FL
MICROALBUMIN 24H UR DL<=1MG/L-MCNC: 1.4 MG/DL
MICROALBUMIN/CREAT 24H UR-RTO: 16 MG/G
NONHDLC SERPL-MCNC: 103 MG/DL
PLATELET # BLD AUTO: 385 K/UL
POTASSIUM SERPL-SCNC: 4.1 MMOL/L
PROT SERPL-MCNC: 7.9 G/DL
RBC # BLD: 4.84 M/UL
RBC # FLD: 13.2 %
SODIUM SERPL-SCNC: 144 MMOL/L
TRIGL SERPL-MCNC: 123 MG/DL
TSH SERPL-ACNC: 2.09 UIU/ML
WBC # FLD AUTO: 7.83 K/UL

## 2024-03-03 NOTE — HISTORY OF PRESENT ILLNESS
[de-identified] : Here to follow-up on her diabetes and blood pressure. History of DM 2/hypertension which has been under variable control, as she has been busy with multiple family issues, unable to cook regular meals and eats erratically throughout the day and unable to make time to exercise regularly: She has been trying to limit her foods but may sometimes stress eat and would like to have her labs drawn to check her A1c as she increased the metformin to 3 tablets a day last August 2023.   She has also been feeling lightheaded a little bit and would like her blood pressure checked 142 150/90 in the office today.  She was seen in the gynecology office a few days ago with elevated blood pressure reading of 168/88 and 179/83 and was advised to follow-up with her PCP.  Irbesartan was started in 2022 but she declined and did not take the medication and was trying to work with her diet. -She is busy dealing with her mother who has dementia and was placed in a nursing home in [place] 2021 but acts out when she sees her children so she goes there regularly but to only drop off food.  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  She has no time for herself.  And tries to avoid stress eating

## 2024-03-03 NOTE — PHYSICAL EXAM
[Normal] : no acute distress, well nourished, well developed and well-appearing [Coordination Grossly Intact] : coordination grossly intact [Normal Gait] : normal gait [Speech Grossly Normal] : speech grossly normal [No Focal Deficits] : no focal deficits [Normal Insight/Judgement] : insight and judgment were intact [Normal Mood] : the mood was normal [Memory Grossly Normal] : memory grossly normal

## 2024-03-05 LAB — CYTOLOGY CVX/VAG DOC THIN PREP: ABNORMAL

## 2024-03-12 DIAGNOSIS — E11.9 TYPE 2 DIABETES MELLITUS WITHOUT COMPLICATIONS: ICD-10-CM

## 2024-08-01 ENCOUNTER — APPOINTMENT (OUTPATIENT)
Dept: INTERNAL MEDICINE | Facility: CLINIC | Age: 59
End: 2024-08-01

## 2024-08-05 ENCOUNTER — APPOINTMENT (OUTPATIENT)
Dept: INTERNAL MEDICINE | Facility: CLINIC | Age: 59
End: 2024-08-05

## 2024-08-05 ENCOUNTER — OUTPATIENT (OUTPATIENT)
Dept: OUTPATIENT SERVICES | Facility: HOSPITAL | Age: 59
LOS: 1 days | End: 2024-08-05
Payer: COMMERCIAL

## 2024-08-05 DIAGNOSIS — I10 ESSENTIAL (PRIMARY) HYPERTENSION: ICD-10-CM

## 2024-08-05 PROCEDURE — 99396 PREV VISIT EST AGE 40-64: CPT

## 2024-08-05 PROCEDURE — G0463: CPT

## 2024-08-12 PROBLEM — E66.3 OVERWEIGHT: Status: ACTIVE | Noted: 2024-08-12

## 2024-08-12 NOTE — HISTORY OF PRESENT ILLNESS
[de-identified] : 58 Chinese female, mother of two teenagers, here for CPE. -2024 Made major changes to improve her diabetes management.  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  Notes she has lost 15 pounds with BMI 22.9 and feels her diabetes is under better control as she is not snacking between meals and consuming more water.  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 Her mother pao makes Chinese meals and irrgeualr eating times, hence the weight gain.  7/2021-has a warty lesion on her right thumb for unknown period of time-months  Hx HTN, noted by her Gyn to have gained weight with elevated BP  in April 2019.-on Lisinopril but stopped after cough, now on ARB. 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 She has not had time to exercise, and her diet has been erratic as busy with her mother/home business/teenage kid. Declined meds until 2022. Began statin Feb 2022, need f/u labs/lipids Begn  metformin July 2022 and to repeat A1c, not checing FS DEXA Aug 2022-normal-f/u 2-3 year Mammogram Aug 2022, f/u 2023 Colonoscopy DR Wharton 2016-f/u 10 years Gyn f/u-Sept 2022 DR Jamison

## 2024-08-12 NOTE — HEALTH RISK ASSESSMENT
[No] : No [No falls in past year] : Patient reported no falls in the past year [0] : 2) Feeling down, depressed, or hopeless: Not at all (0) [PHQ-2 Negative - No further assessment needed] : PHQ-2 Negative - No further assessment needed [Never] : Never [Patient reported mammogram was normal] : Patient reported mammogram was normal [Patient reported PAP Smear was normal] : Patient reported PAP Smear was normal [Patient reported colonoscopy was normal] : Patient reported colonoscopy was normal [Audit-CScore] : 0 [[id number]] : 0 [MammogramDate] : 04/19 [MammogramComments] : Magali [PapSmearDate] : 04/19 [PapSmearComments] : Magali [ColonoscopyDate] : 05/16 [ColonoscopyComments] :

## 2024-08-12 NOTE — HEALTH RISK ASSESSMENT
[No] : No [No falls in past year] : Patient reported no falls in the past year [0] : 2) Feeling down, depressed, or hopeless: Not at all (0) [PHQ-2 Negative - No further assessment needed] : PHQ-2 Negative - No further assessment needed [Never] : Never [Patient reported mammogram was normal] : Patient reported mammogram was normal [Patient reported PAP Smear was normal] : Patient reported PAP Smear was normal [Patient reported colonoscopy was normal] : Patient reported colonoscopy was normal [Audit-CScore] : 0 [[id number]] : 0 [MammogramDate] : 04/19 [MammogramComments] : Magali [PapSmearDate] : 04/19 [PapSmearComments] : Magali [ColonoscopyDate] : 05/16 [ColonoscopyComments] :

## 2024-08-12 NOTE — PHYSICAL EXAM

## 2024-08-12 NOTE — HISTORY OF PRESENT ILLNESS
[de-identified] : 58 [place] female, mother of two teenagers, here for CPE. -2024 Made major changes to improve her diabetes management.  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  Notes she has lost 15 pounds with BMI 22.9 and feels her diabetes is under better control as she is not snacking between meals and consuming more water.  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 Her mother pao makes Chinese meals and irrgeualr eating times, hence the weight gain.  7/2021-has a warty lesion on her right thumb for unknown period of time-months  Hx HTN, noted by her Gyn to have gained weight with elevated BP  in April 2019.-on Lisinopril but stopped after cough, now on ARB. Has 2 children, one at hospitality school at [employer].. She has not had time to exercise, and her diet has been erratic as busy with her mother/home business/teenage kid. Declined meds until 2022. Began statin Feb 2022, need f/u labs/lipids Begn  metformin July 2022 and to repeat A1c, not checing FS DEXA Aug 2022-normal-f/u 2-3 year Mammogram Aug 2022, f/u 2023 Colonoscopy DR Wharton 2016-f/u 10 years Gyn f/u-Sept 2022 DR Jamison

## 2024-08-19 DIAGNOSIS — Z00.00 ENCOUNTER FOR GENERAL ADULT MEDICAL EXAMINATION WITHOUT ABNORMAL FINDINGS: ICD-10-CM

## 2024-08-19 DIAGNOSIS — E11.9 TYPE 2 DIABETES MELLITUS WITHOUT COMPLICATIONS: ICD-10-CM

## 2024-08-19 DIAGNOSIS — E78.5 HYPERLIPIDEMIA, UNSPECIFIED: ICD-10-CM

## 2024-08-19 DIAGNOSIS — E66.3 OVERWEIGHT: ICD-10-CM

## 2024-11-25 ENCOUNTER — OUTPATIENT (OUTPATIENT)
Dept: OUTPATIENT SERVICES | Facility: HOSPITAL | Age: 59
LOS: 1 days | End: 2024-11-25
Payer: COMMERCIAL

## 2024-11-25 ENCOUNTER — APPOINTMENT (OUTPATIENT)
Dept: MAMMOGRAPHY | Facility: CLINIC | Age: 59
End: 2024-11-25
Payer: COMMERCIAL

## 2024-11-25 ENCOUNTER — RESULT REVIEW (OUTPATIENT)
Age: 59
End: 2024-11-25

## 2024-11-25 PROCEDURE — 77067 SCR MAMMO BI INCL CAD: CPT

## 2024-11-25 PROCEDURE — 77067 SCR MAMMO BI INCL CAD: CPT | Mod: 26

## 2024-11-25 PROCEDURE — 77063 BREAST TOMOSYNTHESIS BI: CPT | Mod: 26

## 2024-11-25 PROCEDURE — 77063 BREAST TOMOSYNTHESIS BI: CPT

## 2024-12-16 ENCOUNTER — RX RENEWAL (OUTPATIENT)
Age: 59
End: 2024-12-16

## 2025-01-29 ENCOUNTER — RX RENEWAL (OUTPATIENT)
Age: 60
End: 2025-01-29

## 2025-03-21 ENCOUNTER — APPOINTMENT (OUTPATIENT)
Dept: OBGYN | Facility: CLINIC | Age: 60
End: 2025-03-21
Payer: COMMERCIAL

## 2025-03-21 ENCOUNTER — NON-APPOINTMENT (OUTPATIENT)
Age: 60
End: 2025-03-21

## 2025-03-21 VITALS
WEIGHT: 174 LBS | BODY MASS INDEX: 30.83 KG/M2 | DIASTOLIC BLOOD PRESSURE: 82 MMHG | HEIGHT: 63 IN | SYSTOLIC BLOOD PRESSURE: 138 MMHG | HEART RATE: 84 BPM

## 2025-03-21 DIAGNOSIS — Z01.419 ENCOUNTER FOR GYNECOLOGICAL EXAMINATION (GENERAL) (ROUTINE) W/OUT ABNORMAL FINDINGS: ICD-10-CM

## 2025-03-21 PROCEDURE — 99396 PREV VISIT EST AGE 40-64: CPT

## 2025-03-21 PROCEDURE — 99459 PELVIC EXAMINATION: CPT

## 2025-05-02 ENCOUNTER — RX RENEWAL (OUTPATIENT)
Age: 60
End: 2025-05-02